# Patient Record
Sex: FEMALE | Race: WHITE | NOT HISPANIC OR LATINO | URBAN - METROPOLITAN AREA
[De-identification: names, ages, dates, MRNs, and addresses within clinical notes are randomized per-mention and may not be internally consistent; named-entity substitution may affect disease eponyms.]

---

## 2018-01-12 NOTE — RESULT NOTES
Message    Please call pt and let her know that her throat culture was negative  Patient aware         Verified Results  (Q) CULTURE, THROAT, SPECIAL W/GRP A STREP SUSCEPT  [id number] 12:00AM Sangita Campbell     Test Name Result Flag Reference   CULTURE, THROAT, SPECIAL$W/GRP A STREP SUSCEPT  CULTURE, THROAT, SPECIAL W/GRP A STREP SUSCEPT  MICRO NUMBER:      [id number]    TEST STATUS:       FINAL    SPECIMEN SOURCE:   THROAT    SPECIMEN QUALITY:  ADEQUATE    RESULT:            No oropharyngeal pathogens recovered

## 2022-04-25 ENCOUNTER — OFFICE VISIT (OUTPATIENT)
Dept: FAMILY MEDICINE CLINIC | Facility: HOSPITAL | Age: 42
End: 2022-04-25
Payer: COMMERCIAL

## 2022-04-25 VITALS
DIASTOLIC BLOOD PRESSURE: 80 MMHG | WEIGHT: 160 LBS | TEMPERATURE: 99.6 F | SYSTOLIC BLOOD PRESSURE: 128 MMHG | BODY MASS INDEX: 25.11 KG/M2 | HEIGHT: 67 IN | HEART RATE: 85 BPM

## 2022-04-25 DIAGNOSIS — Z23 ENCOUNTER FOR IMMUNIZATION: Primary | ICD-10-CM

## 2022-04-25 DIAGNOSIS — Z12.31 ENCOUNTER FOR SCREENING MAMMOGRAM FOR BREAST CANCER: ICD-10-CM

## 2022-04-25 DIAGNOSIS — F41.0 PANIC DISORDER: ICD-10-CM

## 2022-04-25 PROBLEM — G43.009 MIGRAINE WITHOUT AURA AND WITHOUT STATUS MIGRAINOSUS, NOT INTRACTABLE: Status: ACTIVE | Noted: 2022-04-25

## 2022-04-25 PROCEDURE — 99202 OFFICE O/P NEW SF 15 MIN: CPT | Performed by: NURSE PRACTITIONER

## 2022-04-25 PROCEDURE — 3008F BODY MASS INDEX DOCD: CPT | Performed by: NURSE PRACTITIONER

## 2022-04-25 PROCEDURE — 3725F SCREEN DEPRESSION PERFORMED: CPT | Performed by: NURSE PRACTITIONER

## 2022-04-25 RX ORDER — FREMANEZUMAB-VFRM 225 MG/1.5ML
INJECTION SUBCUTANEOUS
COMMUNITY
Start: 2022-03-18

## 2022-04-25 RX ORDER — ALPRAZOLAM 0.25 MG/1
TABLET ORAL
Qty: 8 TABLET | Refills: 0 | Status: SHIPPED | OUTPATIENT
Start: 2022-04-25

## 2022-04-25 RX ORDER — TOPIRAMATE 100 MG/1
TABLET, FILM COATED ORAL
COMMUNITY

## 2022-04-25 RX ORDER — OLANZAPINE 5 MG/1
TABLET ORAL
COMMUNITY
Start: 2022-02-15

## 2022-04-25 RX ORDER — ALBUTEROL SULFATE 90 UG/1
AEROSOL, METERED RESPIRATORY (INHALATION)
COMMUNITY
Start: 2022-03-16

## 2022-04-25 RX ORDER — RIMEGEPANT SULFATE 75 MG/75MG
TABLET, ORALLY DISINTEGRATING ORAL
COMMUNITY
Start: 2022-03-31

## 2022-04-25 RX ORDER — SUMATRIPTAN 100 MG/1
TABLET, FILM COATED ORAL
COMMUNITY

## 2022-04-25 NOTE — PROGRESS NOTES
Assessment/Plan:    Panic disorder  Situational    Will prescribe Xanax for travel  Diagnoses and all orders for this visit:    Encounter for immunization  -     Mammo screening bilateral w 3d & cad; Future    Panic disorder  -     ALPRAZolam (XANAX) 0 25 mg tablet; Take 1 tablet by mouth 30 minutes prior to flight  May repeat in 2 hours one time  Other orders  -     albuterol (PROVENTIL HFA,VENTOLIN HFA) 90 mcg/act inhaler  -     Ajovy 225 MG/1 5ML auto-injector; ADMINISTER 1 5 ML SUBCUTANEOUSLY ONCE A MONTH  -     OLANZapine (ZyPREXA) 5 mg tablet  -     Nurtec 75 MG TBDP; PLACE 1 TABLET ORALLY AT THE ONSET OF MIGRAINE  NO MORE THAN ONE DOSE IN 24 HOURS   -     SUMAtriptan (IMITREX) 100 mg tablet; Take by mouth  -     topiramate (Topamax) 100 mg tablet; Take by mouth          Subjective:      Patient ID: Zohra Samuels is a 39 y o  female  Normally has a lot of anxiety  Last year, prior to vacation, anxiety increased  Had panic attack prior to plane ride  She has upcoming [employer] adfly[employer] trip planned and concerned about the flight  Has a trip planned in June and October  Anxiety is situational like talking to people she doesn't know  Has migraines and managed by neurology  The following portions of the patient's history were reviewed and updated as appropriate: allergies, current medications, past family history, past medical history, past social history, past surgical history and problem list     Review of Systems   Psychiatric/Behavioral: Negative for dysphoric mood, sleep disturbance and suicidal ideas  The patient is nervous/anxious  Objective:  Vitals:    04/25/22 1617   BP: 128/80   Pulse: 85   Temp: 99 6 °F (37 6 °C)      Physical Exam  Vitals reviewed  Constitutional:       Appearance: Normal appearance  She is well-developed  Cardiovascular:      Rate and Rhythm: Normal rate and regular rhythm  Heart sounds: Normal heart sounds  No murmur heard        Pulmonary: Effort: Pulmonary effort is normal       Breath sounds: Normal breath sounds  Skin:     General: Skin is warm and dry  Neurological:      Mental Status: She is alert and oriented to person, place, and time  Psychiatric:         Mood and Affect: Mood normal          Behavior: Behavior normal          Thought Content:  Thought content normal          Judgment: Judgment normal

## 2022-04-26 ENCOUNTER — TELEPHONE (OUTPATIENT)
Dept: ADMINISTRATIVE | Facility: OTHER | Age: 42
End: 2022-04-26

## 2022-04-26 NOTE — TELEPHONE ENCOUNTER
----- Message from Jose Raul sent at 4/25/2022  4:28 PM EDT -----  Regarding: Care Gap Request - Pap  04/25/22 4:29 PM    Hello, our patient No patient name on file  has had Pap Smear (HPV) aka Cervical Cancer Screening completed/performed  Please assist in updating the patient chart by making an External outreach to Dr Meron England facility located in [facility]   The date of service is January 2022      Thank you,  Balwinder Carpenter  [phone]

## 2022-04-26 NOTE — LETTER
Procedure Request Form: Cervical Cancer Screening      Date Requested: 22  Patient: Sim Matty  Patient : 1980   Referring Provider: Bc Schroeder, LUCI        Date of Procedure ______________________________       The above patient has informed us that they have completed their   most recent Cervical Cancer Screening at your facility  Please complete   this form and attach all corresponding procedure reports/results  Comments __________________________________________________________  ____________________________________________________________________  ____________________________________________________________________  ____________________________________________________________________    Facility Completing Procedure _________________________________________    Form Completed By (print name) _______________________________________      Signature __________________________________________________________      These reports are needed for  compliance  Please fax this completed form and a copy of the procedure report to our office located at [place] [address] as soon as possible to [phone] attention Willi Downey: Phone [phone]    We thank you for your assistance in treating our mutual patient

## 2022-04-27 NOTE — TELEPHONE ENCOUNTER
Received call from [facility], they stated patient has not been seen since 2009  They will be sending record

## 2022-04-27 NOTE — TELEPHONE ENCOUNTER
Upon review of the In Basket request and the patient's chart, initial outreach has been made via fax, please see Contacts section for details       Thank you  Abbie Kasper

## 2022-04-28 NOTE — TELEPHONE ENCOUNTER
Upon review of the In Basket request we were able to locate, review, and update the patient chart as requested for Pap Smear (HPV) aka Cervical Cancer Screening  Any additional questions or concerns should be emailed to the Practice Liaisons via [email or web address] email, please do not reply via In Basket      Thank you  Mateo Marin

## 2022-06-01 ENCOUNTER — TELEPHONE (OUTPATIENT)
Dept: NEUROLOGY | Facility: CLINIC | Age: 42
End: 2022-06-01

## 2022-06-01 NOTE — TELEPHONE ENCOUNTER
Patient calling to schedule new patient appointment for migraines  No testing done  Triage intake sent

## 2022-06-06 NOTE — TELEPHONE ENCOUNTER
1st attempt to schedule from triage  Patient isn't willing to wait  She will stay with her current neurologist and refused scheduling

## 2024-03-08 ENCOUNTER — TELEPHONE (OUTPATIENT)
Dept: FAMILY MEDICINE CLINIC | Facility: HOSPITAL | Age: 44
End: 2024-03-08

## 2024-03-08 NOTE — TELEPHONE ENCOUNTER
----- Message from LUCI Arellano sent at 3/6/2024 11:57 AM EST -----  Pt has not been seen in 2 years. Please schedule a routine physical.